# Patient Record
Sex: MALE | Race: WHITE
[De-identification: names, ages, dates, MRNs, and addresses within clinical notes are randomized per-mention and may not be internally consistent; named-entity substitution may affect disease eponyms.]

---

## 2018-02-01 ENCOUNTER — HOSPITAL ENCOUNTER (EMERGENCY)
Dept: HOSPITAL 12 - ER | Age: 33
Discharge: HOME | End: 2018-02-01
Payer: COMMERCIAL

## 2018-02-01 VITALS — WEIGHT: 170 LBS | HEIGHT: 69 IN | BODY MASS INDEX: 25.18 KG/M2

## 2018-02-01 VITALS — SYSTOLIC BLOOD PRESSURE: 116 MMHG | DIASTOLIC BLOOD PRESSURE: 74 MMHG

## 2018-02-01 DIAGNOSIS — Z88.2: ICD-10-CM

## 2018-02-01 DIAGNOSIS — B34.9: Primary | ICD-10-CM

## 2018-02-01 LAB
ALP SERPL-CCNC: 93 U/L (ref 50–136)
ALT SERPL W/O P-5'-P-CCNC: 29 U/L (ref 16–63)
APPEARANCE UR: CLEAR
AST SERPL-CCNC: 14 U/L (ref 15–37)
BASOPHILS # BLD AUTO: 0 K/UL (ref 0–8)
BASOPHILS NFR BLD AUTO: 0.3 % (ref 0–2)
BILIRUB DIRECT SERPL-MCNC: 0.1 MG/DL (ref 0–0.2)
BILIRUB SERPL-MCNC: 0.4 MG/DL (ref 0.2–1)
BILIRUB UR QL STRIP: NEGATIVE
BUN SERPL-MCNC: 18 MG/DL (ref 7–18)
CHLORIDE SERPL-SCNC: 98 MMOL/L (ref 98–107)
CO2 SERPL-SCNC: 25 MMOL/L (ref 21–32)
COLOR UR: YELLOW
CREAT SERPL-MCNC: 1.1 MG/DL (ref 0.6–1.3)
DEPRECATED SQUAMOUS URNS QL MICRO: (no result) /HPF
EOSINOPHIL # BLD AUTO: 0 K/UL (ref 0–0.7)
EOSINOPHIL NFR BLD AUTO: 0.2 % (ref 0–7)
GLUCOSE SERPL-MCNC: 117 MG/DL (ref 74–106)
GLUCOSE UR STRIP-MCNC: NEGATIVE MG/DL
HCT VFR BLD AUTO: 38.2 % (ref 36.7–47.1)
HGB BLD-MCNC: 12.8 G/DL (ref 12.5–16.3)
HGB UR QL STRIP: NEGATIVE
KETONES UR STRIP-MCNC: NEGATIVE MG/DL
LEUKOCYTE ESTERASE UR QL STRIP: NEGATIVE
LYMPHOCYTES # BLD AUTO: 1 K/UL (ref 20–40)
LYMPHOCYTES NFR BLD AUTO: 17 % (ref 20.5–51.5)
MCH RBC QN AUTO: 27.8 UUG (ref 23.8–33.4)
MCHC RBC AUTO-ENTMCNC: 34 G/DL (ref 32.5–36.3)
MCV RBC AUTO: 82.8 FL (ref 73–96.2)
MONOCYTES # BLD AUTO: 0.8 K/UL (ref 2–10)
MONOCYTES NFR BLD AUTO: 13.3 % (ref 0–11)
NEUTROPHILS # BLD AUTO: 4.1 K/UL (ref 1.8–8.9)
NEUTROPHILS NFR BLD AUTO: 69.2 % (ref 38.5–71.5)
NITRITE UR QL STRIP: NEGATIVE
PH UR STRIP: 5.5 [PH] (ref 5–8)
PLATELET # BLD AUTO: 260 K/UL (ref 152–348)
POTASSIUM SERPL-SCNC: 4 MMOL/L (ref 3.5–5.1)
PROT UR QL STRIP: NEGATIVE
RBC # BLD AUTO: 4.61 MIL/UL (ref 4.06–5.63)
RBC #/AREA URNS HPF: (no result) /HPF (ref 0–3)
SP GR UR STRIP: <=1.005 (ref 1–1.03)
UROBILINOGEN UR STRIP-MCNC: 0.2 E.U./DL
WBC # BLD AUTO: 5.9 K/UL (ref 3.6–10.2)
WBC #/AREA URNS HPF: (no result) /HPF
WBC #/AREA URNS HPF: (no result) /HPF (ref 0–3)
WS STN SPEC: 7.3 G/DL (ref 6.4–8.2)

## 2018-02-01 PROCEDURE — 81001 URINALYSIS AUTO W/SCOPE: CPT

## 2018-02-01 PROCEDURE — 84484 ASSAY OF TROPONIN QUANT: CPT

## 2018-02-01 PROCEDURE — 99285 EMERGENCY DEPT VISIT HI MDM: CPT

## 2018-02-01 PROCEDURE — 87040 BLOOD CULTURE FOR BACTERIA: CPT

## 2018-02-01 PROCEDURE — 80076 HEPATIC FUNCTION PANEL: CPT

## 2018-02-01 PROCEDURE — 87400 INFLUENZA A/B EACH AG IA: CPT

## 2018-02-01 PROCEDURE — 85025 COMPLETE CBC W/AUTO DIFF WBC: CPT

## 2018-02-01 PROCEDURE — 80048 BASIC METABOLIC PNL TOTAL CA: CPT

## 2018-02-01 PROCEDURE — 71045 X-RAY EXAM CHEST 1 VIEW: CPT

## 2018-02-01 PROCEDURE — 36415 COLL VENOUS BLD VENIPUNCTURE: CPT

## 2018-02-01 PROCEDURE — 87086 URINE CULTURE/COLONY COUNT: CPT

## 2018-02-01 PROCEDURE — A4663 DIALYSIS BLOOD PRESSURE CUFF: HCPCS

## 2018-02-01 NOTE — NUR
Pt C/O fever x10 days. Experienced nausea, but denies feeling nauseous at this 
time. C/O headache and generalized body aches. Stated he has been to his PCP as 
well as infectious disease, but no substantial findings reported.

## 2018-02-01 NOTE — NUR
Patient discharged to home in stable conditon.  Written and verbal after care 
instructions given. 

Patient verbalizes understanding of instructions. Ambulated from ER w/ steady 
gait. Pt took all belongings.